# Patient Record
Sex: MALE | Race: WHITE
[De-identification: names, ages, dates, MRNs, and addresses within clinical notes are randomized per-mention and may not be internally consistent; named-entity substitution may affect disease eponyms.]

---

## 2018-08-03 ENCOUNTER — HOSPITAL ENCOUNTER (OUTPATIENT)
Dept: HOSPITAL 47 - OR | Age: 83
Discharge: HOME | End: 2018-08-03
Attending: ORTHOPAEDIC SURGERY
Payer: MEDICARE

## 2018-08-03 VITALS
HEART RATE: 60 BPM | SYSTOLIC BLOOD PRESSURE: 161 MMHG | TEMPERATURE: 97.5 F | RESPIRATION RATE: 16 BRPM | DIASTOLIC BLOOD PRESSURE: 69 MMHG

## 2018-08-03 VITALS — BODY MASS INDEX: 33.1 KG/M2

## 2018-08-03 DIAGNOSIS — Z79.82: ICD-10-CM

## 2018-08-03 DIAGNOSIS — I10: ICD-10-CM

## 2018-08-03 DIAGNOSIS — Z79.4: ICD-10-CM

## 2018-08-03 DIAGNOSIS — Z53.8: ICD-10-CM

## 2018-08-03 DIAGNOSIS — M10.9: ICD-10-CM

## 2018-08-03 DIAGNOSIS — Z87.891: ICD-10-CM

## 2018-08-03 DIAGNOSIS — E78.5: ICD-10-CM

## 2018-08-03 DIAGNOSIS — Z79.899: ICD-10-CM

## 2018-08-03 DIAGNOSIS — E11.9: ICD-10-CM

## 2018-08-03 DIAGNOSIS — S86.012A: Primary | ICD-10-CM

## 2018-08-03 DIAGNOSIS — Z79.1: ICD-10-CM

## 2018-08-03 LAB
ERYTHROCYTE [DISTWIDTH] IN BLOOD BY AUTOMATED COUNT: 5.26 M/UL (ref 4.3–5.9)
ERYTHROCYTE [DISTWIDTH] IN BLOOD: 14.1 % (ref 11.5–15.5)
GLUCOSE BLD-MCNC: 163 MG/DL (ref 75–99)
HCT VFR BLD AUTO: 47.7 % (ref 39–53)
HGB BLD-MCNC: 16.2 GM/DL (ref 13–17.5)
MCH RBC QN AUTO: 30.8 PG (ref 25–35)
MCHC RBC AUTO-ENTMCNC: 34 G/DL (ref 31–37)
MCV RBC AUTO: 90.8 FL (ref 80–100)
PLATELET # BLD AUTO: 136 K/UL (ref 150–450)
WBC # BLD AUTO: 7.1 K/UL (ref 3.8–10.6)

## 2018-08-03 PROCEDURE — 84132 ASSAY OF SERUM POTASSIUM: CPT

## 2018-08-03 PROCEDURE — 85027 COMPLETE CBC AUTOMATED: CPT

## 2018-08-03 NOTE — P.OPNOTE
Outpatient Note





- .


Assessment/Comments:: 





Mr. Quiroz was seen and examined today in the preoperative area prior to his 

planned fixation of his left Achilles tendon.  He says over the past 3 weeks 

since scheduling the procedure, he has done well he is basically having no pain 

and he is very being very protective of the left foot.  He is currently wearing 

a boot with a half inch heel lift and bearing recently full weight with minimal 

discomfort or problems.  He does not wear the boot tube bed at night.  On 

physical examination today he is able to actively plantarflex and dorsiflex the 

ankle fairly well.  When asked to plantarflex his ankle against resistance, he 

has approximately 5 minus out of 5 strength.  With with some liner flexion 

resistance, I'm able to feel a fairly robust continuous layer of scar tissue 

between the calcaneal tuberosity and the calf muscle.  The area is somewhat 

thickened as is typical for this sort of injury, but there does appear to be 

reconstitution of the continuity of the tendo Achilles even though his initial 

injury was an avulsion.





I spoke to him length about the current planned surgery of fixation operatively 

but we also discussed resumption of conservative management of this injury.  At 

this point I feel that the conservative option would probably be better for him 

as it would avoid a fairly long procedure, the anesthesia, and avoid surgical 

complications that may result from the incision and the dissection.  He is 

happy with this and wishes to proceed with conservative management.  Therefore 

we are canceling surgery today.  We will use the standard protocol that we use 

in the office.  Currently he has approximate 4 weeks of the use of a boot with 

the heel lift.  In another 2-3 weeks we will likely remove the heel left and 

have him engage in some physical therapy.  Between now and then, I have advised 

him to perform active flexion and extension exercises of the ankle and perform 

some water exercises.  He is still to use the boot and I have advised him and 

encouraged him to use a cane to take a little bit of weight off that foot for 

the next couple weeks.





Return visit is 2 weeks in the office with me where we will continue the 

conservative management for his Achilles rupture.  All his questions were 

answered prior to his departure today.